# Patient Record
Sex: FEMALE | Race: WHITE | NOT HISPANIC OR LATINO | URBAN - METROPOLITAN AREA
[De-identification: names, ages, dates, MRNs, and addresses within clinical notes are randomized per-mention and may not be internally consistent; named-entity substitution may affect disease eponyms.]

---

## 2024-07-30 ENCOUNTER — EMERGENCY (EMERGENCY)
Facility: HOSPITAL | Age: 34
LOS: 0 days | Discharge: HOME | End: 2024-07-30
Attending: STUDENT IN AN ORGANIZED HEALTH CARE EDUCATION/TRAINING PROGRAM
Payer: COMMERCIAL

## 2024-07-30 ENCOUNTER — EMERGENCY (EMERGENCY)
Facility: HOSPITAL | Age: 34
LOS: 1 days | End: 2024-07-30
Payer: SELF-PAY

## 2024-07-30 VITALS
RESPIRATION RATE: 18 BRPM | DIASTOLIC BLOOD PRESSURE: 60 MMHG | SYSTOLIC BLOOD PRESSURE: 125 MMHG | HEART RATE: 85 BPM | TEMPERATURE: 98 F | OXYGEN SATURATION: 100 % | HEIGHT: 63 IN | WEIGHT: 125.44 LBS

## 2024-07-30 DIAGNOSIS — R51.9 HEADACHE, UNSPECIFIED: ICD-10-CM

## 2024-07-30 DIAGNOSIS — S09.90XA UNSPECIFIED INJURY OF HEAD, INITIAL ENCOUNTER: ICD-10-CM
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
## 2024-07-30 DIAGNOSIS — S00.81XA ABRASION OF OTHER PART OF HEAD, INITIAL ENCOUNTER: ICD-10-CM
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
## 2024-07-30 DIAGNOSIS — S40.212A ABRASION OF LEFT SHOULDER, INITIAL ENCOUNTER: ICD-10-CM

## 2024-07-30 DIAGNOSIS — S70.211A ABRASION, RIGHT HIP, INITIAL ENCOUNTER: ICD-10-CM

## 2024-07-30 DIAGNOSIS — S70.212A ABRASION, LEFT HIP, INITIAL ENCOUNTER: ICD-10-CM

## 2024-07-30 PROCEDURE — L9981: CPT

## 2024-07-30 PROCEDURE — 93005 ELECTROCARDIOGRAM TRACING: CPT

## 2024-07-30 PROCEDURE — 99284 EMERGENCY DEPT VISIT MOD MDM: CPT

## 2024-07-30 PROCEDURE — 93010 ELECTROCARDIOGRAM REPORT: CPT

## 2024-07-30 PROCEDURE — 99281 EMR DPT VST MAYX REQ PHY/QHP: CPT

## 2024-07-30 PROCEDURE — 99283 EMERGENCY DEPT VISIT LOW MDM: CPT | Mod: 25

## 2024-07-30 RX ORDER — ACETAMINOPHEN 325 MG
650 TABLET ORAL ONCE
Refills: 0 | Status: COMPLETED | OUTPATIENT
Start: 2024-07-30 | End: 2024-07-30

## 2024-07-30 RX ADMIN — Medication 650 MILLIGRAM(S): at 19:16
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
## 2024-07-30 NOTE — ED STATDOCS - PHYSICAL EXAMINATION
Constitutional: NAD AAOx3  Eyes: PERRLA EOMI  Head: Normocephalic, abrasion to the forehead  ENT: No fuentes sign, no raccoon eyes, no hemotympanum, no csf rhinorrhea, no nasal septal hematoma  Mouth: MMM  Cardiac: regular rate   Resp: unlabored breathing  GI: Abd s/nt/nd  Neuro: grossly normal and intact GCS 15 no neuro deficits  Skin: No rashes, no bruising to chest or back. Bilateral abrasions to the area around the ASIS, left anterior shoulder abrasion without tenderness. No seatbelt sign.  Msk: No midline spinal ttp, full ROM of neck, no ttp of facial bones, no ttp to chest wall, pelvis stable, full ROM of all extremities, no extremity tenderness or swelling. No midline spinal tenderness
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
## 2024-07-30 NOTE — ED STATDOCS - NSFOLLOWUPINSTRUCTIONS_ED_ALL_ED_FT
Head Injury, Adult  Three rear views of the head showing how quick, sudden head movements injure the brain.  There are many types of head injuries. Head injuries can be as minor as a small bump, or they can be a serious medical issue. More severe head injuries include:  A jarring injury to the brain (concussion).  A bruise (contusion) of the brain. This means there is bleeding in the brain that can cause swelling.  A cracked skull (skull fracture).  Bleeding in the brain that collects, clots, and forms a bump (hematoma).  After a head injury, most problems occur within the first 24 hours, but side effects may occur up to 7–10 days after the injury. It is important to watch your condition for any changes. You may need to be observed in the emergency department or urgent care, or you may have to stay in the hospital.    What are the causes?  There are many causes of a head injury. Serious head injuries may be caused by car crashes, bicycle or motorcycle crashes, sports injuries, falls, or being struck by an object.    What are the symptoms?  Symptoms of a head injury include a contusion, bump, or bleeding at the site of the injury. Other physical symptoms may include:  Headache.  Nausea or vomiting.  Dizziness.  Blurred or double vision.  Sensitivity to bright lights or loud noises.  Feeling tired.  Trouble waking up.  Severe symptoms such as:  Weakness or numbness on one side of the body.  Slurred speech or swallowing problems.  Loss of consciousness.  Seizures.  Mental symptoms may include:  Irritability.  Confusion and memory problems.  Poor attention and concentration.  Changes in eating or sleeping habits.  Anxiety or depression.  How is this diagnosed?  This condition is diagnosed based on your symptoms and a physical exam. You may also have imaging tests done, such as a CT scan or an MRI.    How is this treated?  Treatment for this condition depends on the severity and type of injury you have. The main goal of treatment is to prevent complications and allow the brain time to heal.    Mild head injury    If you have a mild head injury, you may be sent home, and treatment may include:  Observation. A responsible adult should stay with you for 24 hours after your injury and check on you often.  Physical rest.  Brain rest.  Pain medicines.  Severe head injury    If you have a severe head injury, treatment may include:  Close observation. You may have to stay in the hospital and have:  Frequent physical exams.  Frequent checks of how your brain and nervous system are working.  Your blood pressure and oxygen levels checked.  Medicines to relieve pain, prevent seizures, and decrease brain swelling.  Airway protection and breathing support. This may include using a ventilator.  Monitoring and managing swelling inside the brain.  Brain surgery. Surgery may include:  Removing a collection of blood or blood clots.  Stopping the bleeding.  Removing a part of the skull to make room for the brain to swell.  Follow these instructions at home:  Activity    Rest. Avoid activities that are hard or tiring.  Make sure you get enough sleep.  Let your brain rest by limiting activities that take a lot of thought or attention, such as:  Watching TV.  Playing memory games and doing puzzles.  Job-related work or homework.  Working on the computer, using social media, and texting.  Avoid activities that could cause another head injury, such as playing sports, until your health care provider approves.  Ask your provider when it is safe for you to return to your regular activities, such as work or school.  Ask your provider when you can drive, ride a bicycle, or use machinery. Your ability to react may be slower after a brain injury. Do not do these activities if you are dizzy.  Lifestyle    A sign telling the reader not to drink beer, wine, or hard liquor.  Do not drink alcohol until your provider approves. Do not use drugs. Alcohol and certain drugs may slow your recovery and can put you at risk of further injury.  If it is hard to remember things, write them down.  If you are easily distracted, try to do one thing at a time.  Talk with family members or close friends when making important decisions.  Tell your friends, family, a trusted colleague, and  about your injury, symptoms, and restrictions. Ask them to watch for any problems that are new or get worse.  General instructions    Take over-the-counter and prescription medicines only as told by your provider.  Have a responsible adult stay with you for 24 hours after your head injury. They should watch you for any changes in your symptoms and be ready to get help right away.  Keep all follow-up visits to make sure your needs are being met and catch any new problems early.  How is this prevented?  Avoiding another brain injury is very important. In rare cases, another injury can lead to permanent brain damage, brain swelling, or death. The risk of this is greatest during the first 7–10 days after a head injury. To avoid injuries:  Improve your balance and strength to avoid falls.  Wear a seat belt when you are in a moving vehicle.  Wear a helmet when riding a bicycle, skiing, or doing any other sport that has a risk of injury.  Take safety measures in your home to prevent falls, such as:  Removing clutter and tripping hazards.  Using grab bars in bathrooms and handrails by stairs.  Placing non-slip mats on floors and in bathtubs.  Improving lighting in dim areas.  Where to find more information  Brain Injury Association: biausa.org  Contact a health care provider if:  You have headaches that do not go away.  You have dizziness that does not go away.  You have double vision or vision changes that do not go away.  You have difficulty sleeping.  You have changes in your mood.  You have new symptoms.  Get help right away if:  You have sudden:  Severe headache.  Severe vomiting.  Unequal pupil size. One is bigger than the other.  Vision problems.  Confusion or irritability.  You have a seizure.  Your symptoms get worse.  You have clear or bloody fluid coming from your nose or ears.  These symptoms may be an emergency. Get help right away. Call 911.  Do not wait to see if the symptoms will go away.  Do not drive yourself to the hospital.  This information is not intended to replace advice given to you by your health care provider. Make sure you discuss any questions you have with your health care provider.    Motor Vehicle Collision Injury  ImageIt is common to have injuries to your face, arms, and body after a car accident (motor vehicle collision). These injuries may include:    Cuts.  Burns.  Bruises.  Sore muscles.    These injuries tend to feel worse for the first 24–48 hours. You may feel the stiffest and sorest over the first several hours. You may also feel worse when you wake up the first morning after your accident. After that, you will usually begin to get better with each day. How quickly you get better often depends on:    How bad the accident was.  How many injuries you have.  Where your injuries are.  What types of injuries you have.  If your airbag was used.    Follow these instructions at home:  Medicines     Take and apply over-the-counter and prescription medicines only as told by your doctor.  If you were prescribed antibiotic medicine, take or apply it as told by your doctor. Do not stop using the antibiotic even if your condition gets better.  If You Have a Wound or a Burn:     Clean your wound or burn as told by your doctor.    Wash it with mild soap and water.  Rinse it with water to get all the soap off.  Pat it dry with a clean towel. Do not rub it.    Follow instructions from your doctor about how to take care of your wound or burn. Make sure you:    Wash your hands with soap and water before you change your bandage (dressing). If you cannot use soap and water, use hand .  Change your bandage as told by your doctor.  Leave stitches (sutures), skin glue, or skin tape (adhesive) strips in place, if you have these. They may need to stay in place for 2 weeks or longer. If tape strips get loose and curl up, you may trim the loose edges. Do not remove tape strips completely unless your doctor says it is okay.    Do not scratch or pick at the wound or burn.  Do not break any blisters you may have. Do not peel any skin.  Avoid getting sun on your wound or burn.  Raise (elevate) the wound or burn above the level of your heart while you are sitting or lying down. If you have a wound or burn on your face, you may want to sleep with your head raised. You may do this by putting an extra pillow under your head.  Check your wound or burn every day for signs of infection. Watch for:    Redness, swelling, or pain.  Fluid, blood, or pus.  Warmth.  A bad smell.    General instructions     If directed, put ice on your eyes, face, trunk (torso), or other injured areas.    Put ice in a plastic bag.  Place a towel between your skin and the bag.  Leave the ice on for 20 minutes, 2–3 times a day.    Drink enough fluid to keep your urine clear or pale yellow.  Do not drink alcohol.  Ask your doctor if you have any limits to what you can lift.  Rest. Rest helps your body to heal. Make sure you:    Get plenty of sleep at night. Avoid staying up late at night.  Go to bed at the same time on weekends and weekdays.    Ask your doctor when you can drive, ride a bicycle, or use heavy machinery. Do not do these activities if you are dizzy.  Contact a doctor if:  Your symptoms get worse.  You have any of the following symptoms for more than two weeks after your car accident:    Lasting (chronic) headaches.  Dizziness or balance problems.  Feeling sick to your stomach (nausea).  Vision problems.  More sensitivity to noise or light.  Depression or mood swings.  Feeling worried or nervous (anxiety).  Getting upset or bothered easily.  Memory problems.  Trouble concentrating or paying attention.  Sleep problems.  Feeling tired all the time.    Get help right away if:  You have:    Numbness, tingling, or weakness in your arms or legs.  Very bad neck pain, especially tenderness in the middle of the back of your neck.  A change in your ability to control your pee (urine) or poop (stool).  More pain in any area of your body.  Shortness of breath or light-headedness.  Chest pain.  Blood in your pee, poop, or throw-up (vomit).  Very bad pain in your belly (abdomen) or your back.  Very bad headaches or headaches that are getting worse.  Sudden vision loss or double vision.    Your eye suddenly turns red.  The black center of your eye (pupil) is an odd shape or size.  This information is not intended to replace advice given to you by your health care provider. Make sure you discuss any questions you have with your health care provider.

## 2024-07-30 NOTE — ED STATDOCS - PATIENT PORTAL LINK FT
You can access the FollowMyHealth Patient Portal offered by [facility] by registering at the following website: [email or web address]. By joining [facility]’s FollowMyHealth portal, you will also be able to view your health information using other applications (apps) compatible with our system.